# Patient Record
(demographics unavailable — no encounter records)

---

## 2025-05-20 NOTE — PHYSICAL EXAM
[MA] : MA [FreeTextEntry2] : Delia [Appropriately responsive] : appropriately responsive [Alert] : alert [No Acute Distress] : no acute distress [Soft] : soft [Non-tender] : non-tender [Non-distended] : non-distended [Oriented x3] : oriented x3 [Examination Of The Breasts] : a normal appearance [No Masses] : no breast masses were palpable [Labia Majora] : normal [Labia Minora] : normal [Normal] :  normal

## 2025-05-20 NOTE — HISTORY OF PRESENT ILLNESS
[FreeTextEntry1] : 18 y/o P0 here for annual and to establish care.  New patient to me and practice.  No complaints, appt scheduled by her mother.   Menarche at 14yrs old.  Menses q28-32 days, bleeds for 5-6 days. Periods described as painful with moderate flow. Recent cycle was 36 days, first time this has happened.  Sexual debut at 17yrs old, sexually active and interested in men only. Seven lifetime partners, no longer sexually active with prior partner. Has never had STI testing, agrees to have done today.      [Mammogramdate] : n/a [BreastSonogramDate] : n/a [PapSmeardate] : n/a [BoneDensityDate] : n/a [ColonoscopyDate] : n/a